# Patient Record
Sex: MALE | HISPANIC OR LATINO | Employment: FULL TIME | URBAN - METROPOLITAN AREA
[De-identification: names, ages, dates, MRNs, and addresses within clinical notes are randomized per-mention and may not be internally consistent; named-entity substitution may affect disease eponyms.]

---

## 2018-02-20 ENCOUNTER — OFFICE VISIT (OUTPATIENT)
Dept: OCCUPATIONAL MEDICINE | Facility: CLINIC | Age: 28
End: 2018-02-20
Payer: OTHER MISCELLANEOUS

## 2018-02-20 VITALS
DIASTOLIC BLOOD PRESSURE: 80 MMHG | TEMPERATURE: 98 F | SYSTOLIC BLOOD PRESSURE: 132 MMHG | RESPIRATION RATE: 16 BRPM | OXYGEN SATURATION: 98 % | HEART RATE: 74 BPM

## 2018-02-20 DIAGNOSIS — T78.40XA ALLERGIC REACTION, INITIAL ENCOUNTER: Primary | ICD-10-CM

## 2018-02-20 PROCEDURE — 96372 THER/PROPH/DIAG INJ SC/IM: CPT | Mod: S$GLB,,, | Performed by: FAMILY MEDICINE

## 2018-02-20 PROCEDURE — 99204 OFFICE O/P NEW MOD 45 MIN: CPT | Mod: 25,S$GLB,, | Performed by: NURSE PRACTITIONER

## 2018-02-20 RX ORDER — SODIUM CHLORIDE 9 MG/ML
INJECTION, SOLUTION INTRAVENOUS
Status: COMPLETED | OUTPATIENT
Start: 2018-02-20 | End: 2018-02-20

## 2018-02-20 RX ORDER — PREDNISONE 20 MG/1
20 TABLET ORAL DAILY
Qty: 10 TABLET | Refills: 0 | Status: SHIPPED | OUTPATIENT
Start: 2018-02-20 | End: 2018-03-02

## 2018-02-20 RX ORDER — DIPHENHYDRAMINE HYDROCHLORIDE 50 MG/ML
25 INJECTION INTRAMUSCULAR; INTRAVENOUS ONCE
Status: COMPLETED | OUTPATIENT
Start: 2018-02-20 | End: 2018-02-20

## 2018-02-20 RX ORDER — SODIUM CHLORIDE 9 MG/ML
INJECTION, SOLUTION INTRAVENOUS
Status: DISCONTINUED | OUTPATIENT
Start: 2018-02-20 | End: 2018-02-20

## 2018-02-20 RX ADMIN — SODIUM CHLORIDE: 9 INJECTION, SOLUTION INTRAVENOUS at 06:02

## 2018-02-20 RX ADMIN — DIPHENHYDRAMINE HYDROCHLORIDE 25 MG: 50 INJECTION INTRAMUSCULAR; INTRAVENOUS at 06:02

## 2018-02-21 NOTE — PATIENT INSTRUCTIONS
Reacción Alérgica,Generalizada [Allergic Reaction, Generalized ,Other]  Usted tiene jared reacción alérgica (allergic reaction). Ello puede provocar jared erupción cutánea (salpullido) [rash] con comezón, mareo, desmayo, problemas para respirar o tragar, e hinchazón de la kaden u otras partes del cuerpo.  Puede deberse a que usted haya tenido contacto con algo de borja entorno a lo que se ha vuelto sensible. Puede ser alergia a algún medicamento o cierto alimento. También puede ser alergia a algo que se colocó en la piel o en el sol, o a algo que haya en el aire. A menudo, no es posible encontrar exactamente a qué se debe borja reacción.  El objetivo del tratamiento de celiney es aliviar los síntomas. El salpullido (rash) por lo general va disminuyendo después de unos cuantos días, ondina, en ocasiones, puede durar hasta dos semanas.  Cuidados [place]:  1) Si sabe a qué es alérgico, evite tener contacto con eso, dado que las futuras reacciones podrían ser peores que ésta.  2) Evite usar ropa ajustada y cualquier cosa que aumente la temperatura de la piel (duchas o lakisha calientes, la ronen directa del sol), dado que el calor empeorará la comezón.  3) Puede aplicar compresas de hielo para aliviar áreas locales enrojecidas y con intensa comezón. Puede usar la crema (cream) Lanacaine o el spray Solarcaine (u otro producto que contenga benzocaína [benzocaine], que puede adquirir sin receta) para aliviar la comezón.  4) El Benadryl oral (difenhidramina [diphenhydramine]) es un antihistamínico (antihistamine) que puede conseguirse en tiendas de medicamentos y comestibles. A menos que se le haya recetado un antihistamínico (anthistamine) de venta con receta, puede utilizar Benadryl para reducir la comezón si hay grandes zonas de la piel comprometidas. [place] dosis menores radha el día y mayores por la noche, dado que vasiliy medicamento puede causarle somnolencia. [ NOTA : No tome Benadryl si tiene glaucoma o si tiene problemas al  orinar debido a jared próstata agrandada (enlarged prostate).] Claritin (loratadina [loratadine]) es un antihistamínico (antihistamine) que provoca menos somnolencia y resulta jared buena alternativa para sunitha radha el día.  Programe jared VISITA DE CONTROL con borja médico o vasiliy centro en dos días si los síntomas no mejoran. Si hoy tuvo jared reacción severa, o si ha tenido varias reacciones alérgicas de leves a moderadas en el pasado, pregunte a borja médico sobre las pruebas de alergia (allergy testing) que le permitirán saber a qué es alérgico. Si borja reacción incluyó mareo, desmayo o problemas para respirar o tragar, pregúntele a borja médico si puede llevar un kit contra la alergia (Allergy Kit) [epinefrina inyectable (injectable epinephrine)] para usar en borja casa.  Busque Prontamente Atención Médica  si algo de lo siguiente ocurre:  -- Hinchazón o dificultad para respirar.  -- Hinchazón nueva o que va empeorando en la kaden, los párpados, los labios, la boca, la garganta o la lengua.  -- Debilidad, mareo o desmayo.  Date Last Reviewed: 7/30/2015  © [id number] [employer]. [address] [place] [id number]. Todos los derechos reservados. Esta información no pretende sustituir la atención médica profesional. Sólo borja médico puede diagnosticar y tratar un problema de iza.    TAKE ZYRTEC AND ZANTAC FOR A DAY AFTER YOUR SYMPTOMS RESOLVE COMPELTELY

## 2018-02-21 NOTE — PROGRESS NOTES
Subjective:       Patient ID: Ze VILLAR is a 27 y.o. male.    Vitals:  vitals were not taken for this visit.    Chief Complaint: Allergic Reaction and Shortness of Breath    Pt comes in for allergic reaction that started at work. Pt was at work for about 3 hours. Pt was given a benadryl at work (not sure of the mg). Pt comes in wheezing.       Allergic Reaction   This is a new problem. The current episode started today. The problem has been gradually worsening since onset. Associated symptoms include wheezing.   Shortness of Breath   Associated symptoms include wheezing.     Review of Systems   Respiratory: Positive for shortness of breath and wheezing.        Objective:      Physical Exam   Constitutional: He is oriented to person, place, and time. He appears well-developed and well-nourished. He is cooperative.  Non-toxic appearance. He does not appear ill. He appears distressed (respiratory).   HENT:   Head: Normocephalic and atraumatic.   Right Ear: Hearing, tympanic membrane, external ear and ear canal normal.   Left Ear: Hearing, tympanic membrane, external ear and ear canal normal.   Nose: Nose normal. No mucosal edema, rhinorrhea or nasal deformity. No epistaxis. Right sinus exhibits no maxillary sinus tenderness and no frontal sinus tenderness. Left sinus exhibits no maxillary sinus tenderness and no frontal sinus tenderness.   Mouth/Throat: Uvula is midline, oropharynx is clear and moist and mucous membranes are normal. No trismus in the jaw. Normal dentition. No uvula swelling. No posterior oropharyngeal erythema.   Eyes: Conjunctivae and lids are normal. Right eye exhibits no discharge. Left eye exhibits no discharge. No scleral icterus.   Sclera clear bilat   Neck: Trachea normal, normal range of motion, full passive range of motion without pain and phonation normal. Neck supple.   Cardiovascular: Normal rate, regular rhythm, normal heart sounds, intact distal pulses and normal pulses.     Pulmonary/Chest: Effort normal. No respiratory distress. He has decreased breath sounds. He has wheezes in the right upper field, the right middle field and the right lower field.           Abdominal: Soft. Normal appearance and bowel sounds are normal. He exhibits no distension, no pulsatile midline mass and no mass. There is no tenderness.   Musculoskeletal: Normal range of motion. He exhibits no edema or deformity.   Neurological: He is alert and oriented to person, place, and time. He exhibits normal muscle tone. Coordination normal.   Skin: Skin is warm, dry and intact. He is not diaphoretic. No pallor.   Psychiatric: He has a normal mood and affect. His speech is normal and behavior is normal. Judgment and thought content normal. Cognition and memory are normal.   Nursing note and vitals reviewed.    Lungs with better air movement after treatment(s).  Patient reports ease in breathing.  Abnormal lung findings have resolved.    Assessment:       1. Allergic reaction, initial encounter        Plan:         Allergic reaction, initial encounter  -     predniSONE (DELTASONE) 20 MG tablet; Take 1 tablet (20 mg total) by mouth once daily.  Dispense: 10 tablet; Refill: 0  -     0.9%  NaCl infusion; Inject into the vein one time.  -     0.9%  NaCl infusion; Inject into the vein one time.  -     0.9%  NaCl infusion; Inject into the vein one time.  -     diphenhydrAMINE injection 25 mg; Inject 0.5 mLs (25 mg total) into the vein once.  -     methylPREDNISolone sod suc(PF) injection 125 mg; Inject 125 mg into the vein once.  -     ranitidine tablet 300 mg; Take 2 tablets (300 mg total) by mouth one time.